# Patient Record
Sex: FEMALE | Race: WHITE | NOT HISPANIC OR LATINO
[De-identification: names, ages, dates, MRNs, and addresses within clinical notes are randomized per-mention and may not be internally consistent; named-entity substitution may affect disease eponyms.]

---

## 2019-06-27 PROBLEM — Z00.00 ENCOUNTER FOR PREVENTIVE HEALTH EXAMINATION: Status: ACTIVE | Noted: 2019-06-27

## 2019-07-02 ENCOUNTER — APPOINTMENT (OUTPATIENT)
Dept: BREAST CENTER | Facility: CLINIC | Age: 24
End: 2019-07-02
Payer: COMMERCIAL

## 2019-07-02 VITALS
DIASTOLIC BLOOD PRESSURE: 72 MMHG | BODY MASS INDEX: 20.09 KG/M2 | SYSTOLIC BLOOD PRESSURE: 108 MMHG | WEIGHT: 128 LBS | HEART RATE: 66 BPM | HEIGHT: 67 IN

## 2019-07-02 DIAGNOSIS — Z80.1 FAMILY HISTORY OF MALIGNANT NEOPLASM OF TRACHEA, BRONCHUS AND LUNG: ICD-10-CM

## 2019-07-02 DIAGNOSIS — Z78.9 OTHER SPECIFIED HEALTH STATUS: ICD-10-CM

## 2019-07-02 DIAGNOSIS — Z80.0 FAMILY HISTORY OF MALIGNANT NEOPLASM OF DIGESTIVE ORGANS: ICD-10-CM

## 2019-07-02 PROCEDURE — 99243 OFF/OP CNSLTJ NEW/EST LOW 30: CPT

## 2019-07-02 NOTE — PHYSICAL EXAM
[Bra Size: ___] : Bra Size: [unfilled] [Normocephalic] : normocephalic [Supple] : supple [Atraumatic] : atraumatic [No Supraclavicular Adenopathy] : no supraclavicular adenopathy [No Thyromegaly] : no thyromegaly [Examined in the supine and seated position] : examined in the supine and seated position [No dominant masses] : no dominant masses in right breast  [No dominant masses] : no dominant masses left breast [No Nipple Retraction] : no left nipple retraction [No Nipple Discharge] : no left nipple discharge [No Axillary Lymphadenopathy] : no left axillary lymphadenopathy [No Edema] : no edema [Full ROM] : full range of motion [No Swelling] : no swelling [No Rashes] : no rashes [No Ulceration] : no ulceration [de-identified] : FCC bilat Right > Left. More nodular in UOQ. Could be increased FCC, but limited exam due nodularity but no obvious discrete mass.

## 2019-07-02 NOTE — ASSESSMENT
[FreeTextEntry1] : Beverly is a 25 yo female, here for consultation. Here with her mother Miladis. Pt recently felt axillary and UOQ mass in right breast.  She is mid-cycle. Had an u/s at [facility]:  \par \par FmHx: breast cancer (m- great grandmother dx approx age 60 and m-great grandmother dx approx age 60; p-aunt breast cancer approx age 50) lung cancer (p-grandfather dx 45) colon cancer (p-grandmother dx 60)\par CBE: FCC bilat Right > Left. More nodular in UOQ. Could be increased FCC, but limited exam due nodularity but no obvious discrete mass. ? Right axilla slightly more prominent.

## 2019-07-02 NOTE — HISTORY OF PRESENT ILLNESS
[FreeTextEntry1] : Beverly is a 25 yo female, here for consultation. Here with her mother Miladis. Pt recently felt axillary and UOQ mass in right breast.  She is mid-cycle. Had an u/s at [facility]:  \par \par FmHx: breast cancer (m- great grandmother dx approx age 60 and m-great grandmother dx approx age 60; p-aunt breast cancer approx age 50) lung cancer (p-grandfather dx 45) colon cancer (p-grandmother dx 60)

## 2019-07-02 NOTE — PAST MEDICAL HISTORY
[Menstruating] : The patient is menstruating [Definite ___ (Date)] : the last menstrual period was [unfilled] [Menarche Age ____] : age at menarche was [unfilled] [Excessive Bleeding] : there was excessive bleeding [Regular Cycle Intervals] : have been regular [Total Preg ___] : G[unfilled] [History of Hormone Replacement Treatment] : has no history of hormone replacement treatment [FreeTextEntry7] : OCP's x 2 years, stopped in 2013 [FreeTextEntry6] : None [FreeTextEntry8] : No History

## 2019-07-10 ENCOUNTER — APPOINTMENT (OUTPATIENT)
Dept: ULTRASOUND IMAGING | Facility: HOSPITAL | Age: 24
End: 2019-07-10

## 2019-07-10 ENCOUNTER — OUTPATIENT (OUTPATIENT)
Dept: OUTPATIENT SERVICES | Facility: HOSPITAL | Age: 24
LOS: 1 days | End: 2019-07-10
Payer: COMMERCIAL

## 2019-07-10 PROCEDURE — 76641 ULTRASOUND BREAST COMPLETE: CPT

## 2019-07-10 PROCEDURE — 76641 ULTRASOUND BREAST COMPLETE: CPT | Mod: 26,RT

## 2019-07-17 ENCOUNTER — CHART COPY (OUTPATIENT)
Age: 24
End: 2019-07-17

## 2019-07-29 ENCOUNTER — APPOINTMENT (OUTPATIENT)
Dept: PEDIATRIC MEDICAL GENETICS | Facility: CLINIC | Age: 24
End: 2019-07-29
Payer: COMMERCIAL

## 2019-07-29 PROCEDURE — 96040: CPT

## 2019-08-20 ENCOUNTER — CHART COPY (OUTPATIENT)
Age: 24
End: 2019-08-20

## 2020-10-02 ENCOUNTER — APPOINTMENT (OUTPATIENT)
Dept: BREAST CENTER | Facility: CLINIC | Age: 25
End: 2020-10-02
Payer: COMMERCIAL

## 2020-10-02 VITALS — WEIGHT: 128 LBS | BODY MASS INDEX: 20.09 KG/M2 | HEIGHT: 67 IN | OXYGEN SATURATION: 98 % | HEART RATE: 57 BPM

## 2020-10-02 PROCEDURE — 99213 OFFICE O/P EST LOW 20 MIN: CPT

## 2020-10-02 NOTE — ASSESSMENT
[FreeTextEntry1] : Beverly is a 26 yo female who presents for high risk screening; REENA lifetime risk is 23.6%; family history is significant for breast cancer on both her maternal and paternal side, as well as colon cancer on her paternal side. She is unsure what type of testing her p-aunt had (panel vs BRCA only). Given the history of breast cancer on both sides of her family, plus colon cancer, and the ambiguity of her p-aunts testing, it was decided to proceed with in office testing of the patient today ([facility], panel testing). The risks vs benefits, cost, possible results and their implications were discussed in detail. \par \par The patient has dense fibrocystic tissue on exam, given that she is > 26 yo, will proceed with ordering high risk screening MRI at the end of this month, to coincide with days 7-15 of her menses. \par \par If genetics and/or MRI has significant findings, will have her RTC in 6 months or sooner; if MRI and genetics negative, will have her RTC in 1 year. If MRI not approved, will have her RTC in 6 months for an exam. Beverly verbalized understanding, and is in agreement with the plan.

## 2020-10-02 NOTE — HISTORY OF PRESENT ILLNESS
[FreeTextEntry1] : Beverly is a 26 yo female who presents for high risk screening follow-up; she was last evaluated in July 2019 but Dr. Real for a palpable left axillary mass and a right breast mass in the UOQ for US imaging was negative (no correlate). Today she is still able to palpate both masses with no changes in size. Denies any pain in the masses but notes b/l mastalgia correlating to her menstrual cycle.\par \par She met with a genetics counselor in 2019, who recommended following up with her aunts genetic testing prior to proceeding with testing the patient herself. The patient reports today that genetic testing was negative; she is unsure if it was panel testing vs BRCA only testing. \par \par REENA lifetime risk is 23.6%

## 2020-10-02 NOTE — PHYSICAL EXAM
[Normocephalic] : normocephalic [Supple] : supple [No Supraclavicular Adenopathy] : no supraclavicular adenopathy [No Thyromegaly] : no thyromegaly [Examined in the supine and seated position] : examined in the supine and seated position [Symmetrical] : symmetrical [No dominant masses] : no dominant masses in right breast  [No dominant masses] : no dominant masses left breast [No Nipple Retraction] : no left nipple retraction [No Nipple Discharge] : no left nipple discharge [No Axillary Lymphadenopathy] : no left axillary lymphadenopathy [No Edema] : no edema [No Swelling] : no swelling [No Rashes] : no rashes [No Ulceration] : no ulceration [Breast Nipple Inversion] : nipples not inverted [Breast Nipple Retraction] : nipples not retracted [Breast Nipple Flattening] : nipples not flattened [Breast Nipple Fissures] : nipples not fissured [de-identified] : bilateral fibrocystic breasts, no discrete masses or nodules [de-identified] : Increase in breast density/fibrocystic tissue in the UOQ

## 2020-10-02 NOTE — DATA REVIEWED
[FreeTextEntry1] : -- 6/26/19 ([facility]) b//l US:  negative b/l; BI-RADS 1. \par \par -- 7/10/19 ([facility]) right breast US: negative findings, no US correlate to area of palpable concern. BI-RADS 2.

## 2020-10-02 NOTE — PAST MEDICAL HISTORY
[Menstruating] : The patient is menstruating [Menarche Age ____] : age at menarche was [unfilled] [Definite ___ (Date)] : the last menstrual period was [unfilled] [Excessive Bleeding] : there was excessive bleeding [Regular Cycle Intervals] : have been regular [Total Preg ___] : G[unfilled] [History of Hormone Replacement Treatment] : has no history of hormone replacement treatment [FreeTextEntry6] : None [FreeTextEntry7] : OCP's x 2 years, stopped in 2013 [FreeTextEntry8] : No History

## 2020-10-26 ENCOUNTER — RESULT REVIEW (OUTPATIENT)
Age: 25
End: 2020-10-26

## 2020-10-26 ENCOUNTER — APPOINTMENT (OUTPATIENT)
Dept: MRI IMAGING | Facility: HOSPITAL | Age: 25
End: 2020-10-26

## 2020-10-26 ENCOUNTER — OUTPATIENT (OUTPATIENT)
Dept: OUTPATIENT SERVICES | Facility: HOSPITAL | Age: 25
LOS: 1 days | End: 2020-10-26
Payer: COMMERCIAL

## 2020-10-26 PROCEDURE — C8937: CPT

## 2020-10-26 PROCEDURE — C8908: CPT

## 2020-10-26 PROCEDURE — A9585: CPT

## 2020-10-26 PROCEDURE — 77049 MRI BREAST C-+ W/CAD BI: CPT

## 2020-10-26 PROCEDURE — 77049 MRI BREAST C-+ W/CAD BI: CPT | Mod: 26

## 2020-10-29 ENCOUNTER — NON-APPOINTMENT (OUTPATIENT)
Age: 25
End: 2020-10-29

## 2021-03-30 ENCOUNTER — NON-APPOINTMENT (OUTPATIENT)
Age: 26
End: 2021-03-30

## 2021-04-22 ENCOUNTER — NON-APPOINTMENT (OUTPATIENT)
Age: 26
End: 2021-04-22

## 2021-04-30 ENCOUNTER — APPOINTMENT (OUTPATIENT)
Dept: BREAST CENTER | Facility: CLINIC | Age: 26
End: 2021-04-30
Payer: COMMERCIAL

## 2021-04-30 VITALS — OXYGEN SATURATION: 99 % | HEIGHT: 67 IN | HEART RATE: 81 BPM | BODY MASS INDEX: 20.09 KG/M2 | WEIGHT: 128 LBS

## 2021-04-30 DIAGNOSIS — N63.10 UNSPECIFIED LUMP IN THE RIGHT BREAST, UNSPECIFIED QUADRANT: ICD-10-CM

## 2021-04-30 PROCEDURE — 99213 OFFICE O/P EST LOW 20 MIN: CPT

## 2021-04-30 PROCEDURE — 99072 ADDL SUPL MATRL&STAF TM PHE: CPT

## 2021-04-30 NOTE — ASSESSMENT
[FreeTextEntry1] : Beverly is a 24 yo female who presents for high risk screening; REENA lifetime risk is 23.6%; family history is significant for breast cancer on both her maternal and paternal side, as well as colon cancer on her paternal side. Genetic testing was negative. \par \par The patient has dense fibrocystic tissue on exam, given that she is > 26 yo; given history of palpable lumps and dense fibrocystic tissue on exam, will have her proceed with a mammogram and ultrasound for baseline imaging. She has the second dose of her COVID vaccine scheduled for next week, so will have her wait until June to get that done. If benign, will hold off on future mammograms until at least age 28 or otherwise clinically indicated. If benign, she will RTC in 6 months and proceed with a breast MRI in December.\par \par Discussed with the patient the implications for her lifetime risk, which is considered to be at elevated. Reviewed the standard lifestyle modifications for risk reduction including to limit alcohol intake, follow a low-fat diet, maintain a healthy weight, & avoid smoking.\par \par The patient verbalized understanding and is in agreement with the plan. \par

## 2021-04-30 NOTE — PAST MEDICAL HISTORY
[Menstruating] : The patient is menstruating [Menarche Age ____] : age at menarche was [unfilled] [Excessive Bleeding] : there was excessive bleeding [Regular Cycle Intervals] : have been regular [Total Preg ___] : G[unfilled] [Approximately ___] : the LMP was approximately [unfilled] [History of Hormone Replacement Treatment] : has no history of hormone replacement treatment [FreeTextEntry6] : None [FreeTextEntry7] : OCP's x 2 years, stopped in 2013 [FreeTextEntry8] : No History

## 2021-04-30 NOTE — DATA REVIEWED
[FreeTextEntry1] : -- 6/26/19 ([facility]) b//l US:  negative b/l; BI-RADS 1. \par \par -- 7/10/19 ([facility]) right breast US: negative findings, no US correlate to area of palpable concern. BI-RADS 2. \par \par -- 10/29/2020 ([facility]) Benign findings. BI-RADS 1. Rec MRI in 1 year\par

## 2021-04-30 NOTE — PHYSICAL EXAM
[Normocephalic] : normocephalic [Supple] : supple [No Supraclavicular Adenopathy] : no supraclavicular adenopathy [No Thyromegaly] : no thyromegaly [Examined in the supine and seated position] : examined in the supine and seated position [Symmetrical] : symmetrical [No dominant masses] : no dominant masses in right breast  [No dominant masses] : no dominant masses left breast [No Nipple Retraction] : no left nipple retraction [No Nipple Discharge] : no left nipple discharge [No Axillary Lymphadenopathy] : no left axillary lymphadenopathy [No Edema] : no edema [No Swelling] : no swelling [No Rashes] : no rashes [No Ulceration] : no ulceration [Breast Nipple Inversion] : nipples not inverted [Breast Nipple Retraction] : nipples not retracted [Breast Nipple Flattening] : nipples not flattened [Breast Nipple Fissures] : nipples not fissured [de-identified] : bilateral fibrocystic breasts, no discrete masses or nodules [de-identified] : Increase in breast density/fibrocystic tissue in the UOQ

## 2021-04-30 NOTE — HISTORY OF PRESENT ILLNESS
[FreeTextEntry1] : Beverly is a 24 yo female who presents for high risk screening follow-up for a palpable left axillary mass and a right breast mass; imaging has been negative. She is unsure if the masses are still there. Denies any pain in the masses but notes b/l mastalgia correlating to her menstrual cycle.\par \par REENA lifetime risk is 23.6%. She completed genetic testing in 2020 with negative results\par

## 2021-06-14 ENCOUNTER — APPOINTMENT (OUTPATIENT)
Dept: MAMMOGRAPHY | Facility: CLINIC | Age: 26
End: 2021-06-14
Payer: COMMERCIAL

## 2021-06-14 ENCOUNTER — RESULT REVIEW (OUTPATIENT)
Age: 26
End: 2021-06-14

## 2021-06-14 ENCOUNTER — APPOINTMENT (OUTPATIENT)
Dept: ULTRASOUND IMAGING | Facility: CLINIC | Age: 26
End: 2021-06-14
Payer: COMMERCIAL

## 2021-06-14 ENCOUNTER — OUTPATIENT (OUTPATIENT)
Dept: OUTPATIENT SERVICES | Facility: HOSPITAL | Age: 26
LOS: 1 days | End: 2021-06-14

## 2021-06-14 PROCEDURE — G0279: CPT | Mod: 26

## 2021-06-14 PROCEDURE — 76641 ULTRASOUND BREAST COMPLETE: CPT | Mod: 26,LT

## 2021-06-14 PROCEDURE — 77066 DX MAMMO INCL CAD BI: CPT | Mod: 26

## 2021-06-14 PROCEDURE — 76641 ULTRASOUND BREAST COMPLETE: CPT | Mod: 26,RT

## 2021-06-16 ENCOUNTER — NON-APPOINTMENT (OUTPATIENT)
Age: 26
End: 2021-06-16

## 2021-11-18 ENCOUNTER — NON-APPOINTMENT (OUTPATIENT)
Age: 26
End: 2021-11-18

## 2021-11-19 ENCOUNTER — APPOINTMENT (OUTPATIENT)
Dept: BREAST CENTER | Facility: CLINIC | Age: 26
End: 2021-11-19
Payer: COMMERCIAL

## 2021-11-19 VITALS — WEIGHT: 128 LBS | BODY MASS INDEX: 20.09 KG/M2 | HEART RATE: 81 BPM | HEIGHT: 67 IN | OXYGEN SATURATION: 100 %

## 2021-11-19 DIAGNOSIS — Z91.89 OTHER SPECIFIED PERSONAL RISK FACTORS, NOT ELSEWHERE CLASSIFIED: ICD-10-CM

## 2021-11-19 DIAGNOSIS — N60.19 DIFFUSE CYSTIC MASTOPATHY OF UNSPECIFIED BREAST: ICD-10-CM

## 2021-11-19 DIAGNOSIS — N64.59 OTHER SIGNS AND SYMPTOMS IN BREAST: ICD-10-CM

## 2021-11-19 DIAGNOSIS — Z80.3 FAMILY HISTORY OF MALIGNANT NEOPLASM OF BREAST: ICD-10-CM

## 2021-11-19 PROCEDURE — 99213 OFFICE O/P EST LOW 20 MIN: CPT

## 2021-11-22 PROBLEM — Z80.3 FAMILY HISTORY OF MALIGNANT NEOPLASM OF BREAST: Status: ACTIVE | Noted: 2019-07-02

## 2021-11-22 PROBLEM — N64.59 ABNORMAL BREAST EXAM: Status: ACTIVE | Noted: 2019-07-02

## 2021-11-22 PROBLEM — N60.19 BREAST FIBROCYSTIC DISORDER: Status: ACTIVE | Noted: 2019-07-02

## 2021-11-22 NOTE — HISTORY OF PRESENT ILLNESS
[FreeTextEntry1] : Beverly is a 27 yo female who presents for high risk screening follow-up for a palpable left axillary mass and a right breast mass; imaging has been negative. Denies any pain in the masses but notes b/l mastalgia correlating to her menstrual cycle. Denies fevers, chills, skin changes, nipple discharge. \par \par REENA lifetime risk is 23.6%. She completed genetic testing in 2020 with negative results

## 2021-11-22 NOTE — PHYSICAL EXAM
[Normocephalic] : normocephalic [Supple] : supple [No Supraclavicular Adenopathy] : no supraclavicular adenopathy [No Thyromegaly] : no thyromegaly [Examined in the supine and seated position] : examined in the supine and seated position [Symmetrical] : symmetrical [No dominant masses] : no dominant masses in right breast  [No dominant masses] : no dominant masses left breast [No Nipple Retraction] : no left nipple retraction [No Nipple Discharge] : no left nipple discharge [No Axillary Lymphadenopathy] : no left axillary lymphadenopathy [No Edema] : no edema [No Swelling] : no swelling [No Rashes] : no rashes [No Ulceration] : no ulceration [Breast Nipple Inversion] : nipples not inverted [Breast Nipple Retraction] : nipples not retracted [Breast Nipple Flattening] : nipples not flattened [Breast Nipple Fissures] : nipples not fissured [de-identified] : bilateral fibrocystic breasts, no discrete masses or nodules [de-identified] : Increase in breast density/fibrocystic tissue in the UOQ

## 2021-11-22 NOTE — ASSESSMENT
[FreeTextEntry1] : Beverly is a 27 yo female who presents for high risk screening; REENA lifetime risk is 23.6%; family history is significant for breast cancer on both her maternal and paternal side, as well as colon cancer on her paternal side. Genetic testing was negative. \par \par The patient has dense fibrocystic tissue on exam; all baseline iamging (mmg, US, and MRI) has been benign. Breast exam today is unchanged from prior. Will have the patient RTC in 6 months for an exam; no need for additional imaging at this time. The patient verbalized understanding and is in agreement with the plan.

## 2021-11-22 NOTE — PAST MEDICAL HISTORY
[Menstruating] : The patient is menstruating [Menarche Age ____] : age at menarche was [unfilled] [Approximately ___] : the LMP was approximately [unfilled] [Excessive Bleeding] : there was excessive bleeding [Regular Cycle Intervals] : have been regular [Total Preg ___] : G[unfilled] [History of Hormone Replacement Treatment] : has no history of hormone replacement treatment [FreeTextEntry6] : None [FreeTextEntry7] : OCP's x 2 years, stopped in 2013 [FreeTextEntry8] : No History

## 2021-12-06 ENCOUNTER — APPOINTMENT (OUTPATIENT)
Dept: MRI IMAGING | Facility: CLINIC | Age: 26
End: 2021-12-06

## 2022-05-13 ENCOUNTER — APPOINTMENT (OUTPATIENT)
Dept: BREAST CENTER | Facility: CLINIC | Age: 27
End: 2022-05-13